# Patient Record
Sex: MALE | Race: OTHER | Employment: FULL TIME | URBAN - METROPOLITAN AREA
[De-identification: names, ages, dates, MRNs, and addresses within clinical notes are randomized per-mention and may not be internally consistent; named-entity substitution may affect disease eponyms.]

---

## 2017-09-20 ENCOUNTER — APPOINTMENT (OUTPATIENT)
Dept: CT IMAGING | Age: 47
End: 2017-09-20
Attending: EMERGENCY MEDICINE
Payer: SELF-PAY

## 2017-09-20 ENCOUNTER — HOSPITAL ENCOUNTER (EMERGENCY)
Age: 47
Discharge: HOME | End: 2017-09-21
Attending: EMERGENCY MEDICINE
Payer: SELF-PAY

## 2017-09-20 VITALS
SYSTOLIC BLOOD PRESSURE: 135 MMHG | TEMPERATURE: 98.2 F | BODY MASS INDEX: 27.28 KG/M2 | RESPIRATION RATE: 16 BRPM | HEART RATE: 70 BPM | DIASTOLIC BLOOD PRESSURE: 82 MMHG | HEIGHT: 68 IN | WEIGHT: 180 LBS | OXYGEN SATURATION: 97 %

## 2017-09-20 DIAGNOSIS — K61.0 PERIANAL ABSCESS: Primary | ICD-10-CM

## 2017-09-20 LAB
ANION GAP SERPL CALC-SCNC: 7 MMOL/L (ref 5–15)
BASOPHILS # BLD: 0 K/UL (ref 0–0.1)
BASOPHILS NFR BLD: 1 % (ref 0–1)
BUN SERPL-MCNC: 10 MG/DL (ref 6–20)
BUN/CREAT SERPL: 11 (ref 12–20)
CALCIUM SERPL-MCNC: 9.3 MG/DL (ref 8.5–10.1)
CHLORIDE SERPL-SCNC: 101 MMOL/L (ref 97–108)
CO2 SERPL-SCNC: 29 MMOL/L (ref 21–32)
CREAT SERPL-MCNC: 0.87 MG/DL (ref 0.7–1.3)
EOSINOPHIL # BLD: 0.1 K/UL (ref 0–0.4)
EOSINOPHIL NFR BLD: 1 % (ref 0–7)
ERYTHROCYTE [DISTWIDTH] IN BLOOD BY AUTOMATED COUNT: 13 % (ref 11.5–14.5)
GLUCOSE SERPL-MCNC: 125 MG/DL (ref 65–100)
HCT VFR BLD AUTO: 46.9 % (ref 36.6–50.3)
HGB BLD-MCNC: 16.2 G/DL (ref 12.1–17)
LYMPHOCYTES # BLD: 2.8 K/UL (ref 0.8–3.5)
LYMPHOCYTES NFR BLD: 33 % (ref 12–49)
MCH RBC QN AUTO: 32.3 PG (ref 26–34)
MCHC RBC AUTO-ENTMCNC: 34.5 G/DL (ref 30–36.5)
MCV RBC AUTO: 93.4 FL (ref 80–99)
MONOCYTES # BLD: 0.9 K/UL (ref 0–1)
MONOCYTES NFR BLD: 10 % (ref 5–13)
NEUTS SEG # BLD: 4.6 K/UL (ref 1.8–8)
NEUTS SEG NFR BLD: 55 % (ref 32–75)
PLATELET # BLD AUTO: 294 K/UL (ref 150–400)
POTASSIUM SERPL-SCNC: 3.7 MMOL/L (ref 3.5–5.1)
RBC # BLD AUTO: 5.02 M/UL (ref 4.1–5.7)
SODIUM SERPL-SCNC: 137 MMOL/L (ref 136–145)
WBC # BLD AUTO: 8.4 K/UL (ref 4.1–11.1)

## 2017-09-20 PROCEDURE — 85025 COMPLETE CBC W/AUTO DIFF WBC: CPT | Performed by: EMERGENCY MEDICINE

## 2017-09-20 PROCEDURE — 99283 EMERGENCY DEPT VISIT LOW MDM: CPT

## 2017-09-20 PROCEDURE — 96361 HYDRATE IV INFUSION ADD-ON: CPT

## 2017-09-20 PROCEDURE — 74011000250 HC RX REV CODE- 250: Performed by: EMERGENCY MEDICINE

## 2017-09-20 PROCEDURE — 74011250636 HC RX REV CODE- 250/636: Performed by: EMERGENCY MEDICINE

## 2017-09-20 PROCEDURE — 75810000289 HC I&D ABSCESS SIMP/COMP/MULT

## 2017-09-20 PROCEDURE — 74011000258 HC RX REV CODE- 258: Performed by: EMERGENCY MEDICINE

## 2017-09-20 PROCEDURE — 74011636320 HC RX REV CODE- 636/320: Performed by: EMERGENCY MEDICINE

## 2017-09-20 PROCEDURE — 96374 THER/PROPH/DIAG INJ IV PUSH: CPT

## 2017-09-20 PROCEDURE — 96376 TX/PRO/DX INJ SAME DRUG ADON: CPT

## 2017-09-20 PROCEDURE — 74011250637 HC RX REV CODE- 250/637: Performed by: EMERGENCY MEDICINE

## 2017-09-20 PROCEDURE — 80048 BASIC METABOLIC PNL TOTAL CA: CPT | Performed by: EMERGENCY MEDICINE

## 2017-09-20 PROCEDURE — 36415 COLL VENOUS BLD VENIPUNCTURE: CPT | Performed by: EMERGENCY MEDICINE

## 2017-09-20 PROCEDURE — 72193 CT PELVIS W/DYE: CPT

## 2017-09-20 RX ORDER — HYDROMORPHONE HYDROCHLORIDE 1 MG/ML
1 INJECTION, SOLUTION INTRAMUSCULAR; INTRAVENOUS; SUBCUTANEOUS
Status: COMPLETED | OUTPATIENT
Start: 2017-09-20 | End: 2017-09-20

## 2017-09-20 RX ORDER — SODIUM CHLORIDE 0.9 % (FLUSH) 0.9 %
10 SYRINGE (ML) INJECTION
Status: COMPLETED | OUTPATIENT
Start: 2017-09-20 | End: 2017-09-20

## 2017-09-20 RX ORDER — SULFAMETHOXAZOLE AND TRIMETHOPRIM 800; 160 MG/1; MG/1
2 TABLET ORAL
Status: COMPLETED | OUTPATIENT
Start: 2017-09-20 | End: 2017-09-20

## 2017-09-20 RX ORDER — SULFAMETHOXAZOLE AND TRIMETHOPRIM 800; 160 MG/1; MG/1
2 TABLET ORAL 2 TIMES DAILY
Qty: 40 TAB | Refills: 0 | Status: SHIPPED | OUTPATIENT
Start: 2017-09-20 | End: 2017-09-30

## 2017-09-20 RX ORDER — OXYCODONE AND ACETAMINOPHEN 5; 325 MG/1; MG/1
1-2 TABLET ORAL
Qty: 20 TAB | Refills: 0 | Status: SHIPPED | OUTPATIENT
Start: 2017-09-20

## 2017-09-20 RX ORDER — HYDROMORPHONE HYDROCHLORIDE 1 MG/ML
1 INJECTION, SOLUTION INTRAMUSCULAR; INTRAVENOUS; SUBCUTANEOUS ONCE
Status: COMPLETED | OUTPATIENT
Start: 2017-09-20 | End: 2017-09-20

## 2017-09-20 RX ORDER — LIDOCAINE HYDROCHLORIDE AND EPINEPHRINE 20; 10 MG/ML; UG/ML
1.5 INJECTION, SOLUTION INFILTRATION; PERINEURAL
Status: COMPLETED | OUTPATIENT
Start: 2017-09-20 | End: 2017-09-20

## 2017-09-20 RX ADMIN — Medication 10 ML: at 21:45

## 2017-09-20 RX ADMIN — SULFAMETHOXAZOLE AND TRIMETHOPRIM 2 TABLET: 800; 160 TABLET ORAL at 23:48

## 2017-09-20 RX ADMIN — HYDROMORPHONE HYDROCHLORIDE 1 MG: 1 INJECTION, SOLUTION INTRAMUSCULAR; INTRAVENOUS; SUBCUTANEOUS at 20:56

## 2017-09-20 RX ADMIN — SODIUM CHLORIDE 100 ML: 900 INJECTION, SOLUTION INTRAVENOUS at 21:45

## 2017-09-20 RX ADMIN — IOPAMIDOL 100 ML: 612 INJECTION, SOLUTION INTRAVENOUS at 21:45

## 2017-09-20 RX ADMIN — LIDOCAINE HYDROCHLORIDE,EPINEPHRINE BITARTRATE 30 MG: 20; .01 INJECTION, SOLUTION INFILTRATION; PERINEURAL at 23:21

## 2017-09-20 RX ADMIN — HYDROMORPHONE HYDROCHLORIDE 1 MG: 1 INJECTION, SOLUTION INTRAMUSCULAR; INTRAVENOUS; SUBCUTANEOUS at 23:21

## 2017-09-20 NOTE — Clinical Note
Percocet:1-2 pills every 4-6 hours for pain. Be aware of sedating effects. No alcohol or driving with this medicine. Bactrim: 2 pills every 12 hours for 10 days. Take packing out tomorrow. Use warm soaks and sitzs baths. Return to ER for any fever,  chills, nausea, vomiting, pain, bleeding, swelling. Call Dr. Shayne Rendon office in morning.

## 2017-09-20 NOTE — ED PROVIDER NOTES
HPI Comments: 51-year-old male presents to the emergency room for evaluation of a abscess near his rectum. Patient states his present for the past one to one and a half weeks. It started smaller progressively gotten bigger. Pain is rated 10/10. Described as aching. It does not radiate. No associated nausea or vomiting. Pain is worse when he attempts to move his bowels or walks. No abdominal pain. No fever or chills. No chest pain or shortness of breath. No dizziness or lightheadedness. Patient has taken Advil with no relief. No alleviating factors    Social hx  Nonsmoker  No alcohol    Patient is a 41 y.o. male presenting with abscess. The history is provided by the patient. Abscess           History reviewed. No pertinent past medical history. History reviewed. No pertinent surgical history. History reviewed. No pertinent family history. Social History     Social History    Marital status: SINGLE     Spouse name: N/A    Number of children: N/A    Years of education: N/A     Occupational History    Not on file. Social History Main Topics    Smoking status: Never Smoker    Smokeless tobacco: Never Used    Alcohol use No    Drug use: No    Sexual activity: Not on file     Other Topics Concern    Not on file     Social History Narrative    No narrative on file         ALLERGIES: Review of patient's allergies indicates no known allergies. Review of Systems   Constitutional: Negative for chills and fever. Respiratory: Negative for chest tightness, shortness of breath and wheezing. Cardiovascular: Negative for chest pain and palpitations. Gastrointestinal: Positive for rectal pain. Negative for abdominal pain, anal bleeding, diarrhea, nausea and vomiting. Genitourinary: Negative for difficulty urinating, dysuria, frequency, hematuria and testicular pain. Musculoskeletal: Negative for back pain, myalgias and neck stiffness. Skin: Negative for rash and wound.    Neurological: Negative for dizziness and headaches. All other systems reviewed and are negative. Vitals:    09/20/17 1827   BP: (!) 163/107   Pulse: 78   Resp: 20   Temp: 98.5 °F (36.9 °C)   SpO2: 99%   Weight: 81.6 kg (180 lb)   Height: 5' 8\" (1.727 m)            Physical Exam   Constitutional: He is oriented to person, place, and time. He appears well-developed and well-nourished. No distress. HENT:   Head: Normocephalic and atraumatic. Eyes: Conjunctivae are normal. Pupils are equal, round, and reactive to light. Neck: Normal range of motion. Neck supple. Cardiovascular: Normal rate and regular rhythm. Pulmonary/Chest: Effort normal and breath sounds normal. No respiratory distress. Abdominal: He exhibits no distension and no mass. There is no tenderness. There is no rebound and no guarding. Genitourinary:   Genitourinary Comments: Left firm 5 cm tender area to rectum. Tender with digital exam.     Neurological: He is alert and oriented to person, place, and time. Skin: Skin is warm and dry. Psychiatric: He has a normal mood and affect. His behavior is normal. Judgment and thought content normal.   Nursing note and vitals reviewed. MDM  Number of Diagnoses or Management Options  Perianal abscess:   Diagnosis management comments: 41year-old male presenting for rectal abscess. Patient has a large abscess near his rectum on exam. He is afebrile. He is nontoxic appearing. Due to location willl get CT scan for further evaluation. Check labs, give IV fluids and pain medicine. 10:30 PM  CT with perianal abscess. Pt case including HPI, PE, and all available lab and radiology results has been discussed with Dr. Kirti Holliday. He reviewed CT. He recommends incision to relieve drainage and packing and bactrim. He will see patient in office on Monday for followup. Pt to call office tomorrow. Standard narcotic and sedating medication warnings given  Patient's results have been reviewed with them. Patient and/or family have verbally conveyed their understanding and agreement of the patient's signs, symptoms, diagnosis, treatment and prognosis and additionally agree to follow up as recommended or return to the Emergency Room should their condition change prior to follow-up. Discharge instructions have also been provided to the patient with some educational information regarding their diagnosis as well a list of reasons why they would want to return to the ER prior to their follow-up appointment should their condition change. Amount and/or Complexity of Data Reviewed  Discuss the patient with other providers: yes (ER attending-Mina)    Patient Progress  Patient progress: stable    ED Course       I&D Abcess Complex  Date/Time: 9/20/2017 11:59 PM  Performed by: Nicolle Addison  Authorized by: Emir OLVERA     Consent:     Consent obtained:  Verbal    Consent given by:  Patient    Risks discussed:  Pain, damage to other organs, infection, incomplete drainage and bleeding    Alternatives discussed:  Delayed treatment  Location:     Type:  Abscess    Size:  3 cm    Location:  Anogenital    Anogenital location:  Perirectal  Pre-procedure details:     Skin preparation:  Betadine  Anesthesia (see MAR for exact dosages): Anesthesia method:  None  Procedure type:     Complexity:  Complex  Procedure details:     Needle aspiration: no      Incision types:  Stab incision and single straight    Scalpel blade:  11    Drainage:  Purulent    Drainage amount:  Copious    Wound treatment:  Wound left open    Packing materials:  1/4 in gauze  Post-procedure details:     Patient tolerance of procedure: Tolerated well, no immediate complications                   Pt case including HPI, PE, and all available lab and radiology results has been discussed with attending physician. Opportunity to evaluate patient has been provided to ER attending.   Discharge and prescription plan has been agreed upon.

## 2017-09-21 NOTE — DISCHARGE INSTRUCTIONS
Absceso perirrectal: Instrucciones de cuidado - [ Perirectal Abscess: Care Instructions ]  Instrucciones de cuidado  Un absceso perirrectal es iris infección que produce un saco de pus cerca del ano. La dimas puede picar y doler bastante. [place] de los abscesos son causados por iris glándula anal bloqueada que se infecta. También pueden ser causados por un desgarro, o fisura, en el ano. Las enfermedades que [employer] colon, andrew la enfermedad de Crohn, también pueden causar la afección. Es posible que akers médico haya drenado el absceso para ayudar a tratar la infección. También puede haberle recetado antibióticos. El cuidado en el hogar puede ayudarle a sanar. Es posible que le hayan dado un sedante para ayudarle a relajarse. Usted podría estar algo tambaleante después de la sedación. Pueden pasar algunas horas hasta que los efectos del medicamento desaparezcan. Los efectos secundarios comunes de la sedación incluyen náuseas, vómitos y sensación de somnolencia o cansancio. El médico lo ribeiro examinado minuciosamente, caroline pueden presentarse problemas más tarde. Si nota algún problema o nuevos síntomas, busque tratamiento médico de inmediato. La atención de seguimiento es iris parte clave de akers tratamiento y seguridad. Asegúrese de hacer y acudir a todas las citas, y llame a akers médico si está teniendo problemas. También es iris buena idea saber los resultados de issa exámenes y mantener iris lista de los medicamentos que nixon. ¿Cómo puede cuidarse en el hogar? · Si el médico le leonel un sedante:  ¨ Michael 24 horas, no josiah nada que requiera prestar atención a detalles. Hace falta tiempo hasta que los efectos del medicamento desaparezcan por completo. ¨ Para akers seguridad, no conduzca ni opere maquinaria que pudiera ser [employer]o. Espere hasta que los efectos del medicamento desaparezcan y usted pueda pensar con claridad y reaccionar con facilidad.   · Siéntese en unas pocas pulgadas de agua tibia (baño de asiento) 3 veces al día y después de evacuar el intestino. El agua tibia ayuda con el dolor y la comezón. · Si thorpe médico le recetó antibióticos, tómelos exactamente según las indicaciones. No deje de tomarlos solo porque se sienta mejor. Debe jose alfredo todos los antibióticos hasta terminarlos. · [address] thorpe médico si lo jana a casa con un drenaje o tapón en el absceso. · [employer] analgésicos (medicamentos para el dolor) exactamente andrew le fueron indicados. ¨ Si el médico le recetó un analgésico, tómelo según las indicaciones. ¨ Si no está tomando un analgésico recetado, pregúntele a thorpe médico si puede jose alfredo alyssa de [employer]. · Use ablandadores de heces según las indicaciones. · Evite el papel higiénico aromatizado o de colores, que podría irritar la dimas anal.  · Limpie suavemente la dimas con bolitas de algodón húmedo, un paño tibio, toallitas para bebés o toallitas medicinales, andrew Preparation H o Tucks. ¿Cuándo debe pedir ayuda? Llame al 911 en cualquier momento que considere que necesita atención de [place]. Por ejemplo, llame si:  · Tiene dificultad para respirar. · Se desmayó (perdió el conocimiento). Llame a thorpe médico ahora mismo o busque atención médica inmediata si:  · Tiene náuseas o vómito nuevos o peores. · Thorpe dolor aumenta. · Tiene dolor con fiebre. · Maritza heces son negruzcas y parecidas al alquitrán o tienen rastros de [place]. · Tiene hinchazón o un bulto en o alrededor del ano. · Tiene pus en las heces. Preste especial atención a los cambios en thorpe [place] y asegúrese de comunicarse con thorpe médico si:  · Thorpe dolor no mejora en alyssa o [employer]. ¿Dónde puede encontrar más información en inglés? [place] a [email or web address]-[email or web address].[email or web address] Escriba E234 en la búsqueda para aprender más acerca de \"Absceso perirrectal: Instrucciones de cuidado - [ Perirectal Abscess: Care Instructions ]. \"  Revisado: 9 agosto, 2016  Versión del contenido: 11.3  © 2135-9208 [employer]. Las instrucciones de cuidado fueron adaptadas bajo licencia por [employer] (which disclaims liability or warranty for this information). Si usted tiene [place] afección médica o sobre estas instrucciones, siempre pregunte a akers profesional de [place]. [employer] niega toda garantía o responsabilidad por akers uso de esta información.